# Patient Record
Sex: FEMALE | URBAN - METROPOLITAN AREA
[De-identification: names, ages, dates, MRNs, and addresses within clinical notes are randomized per-mention and may not be internally consistent; named-entity substitution may affect disease eponyms.]

---

## 2020-11-23 ENCOUNTER — OFFICE VISIT (OUTPATIENT)
Dept: URBAN - METROPOLITAN AREA CLINIC 33 | Facility: CLINIC | Age: 55
End: 2020-11-23

## 2020-11-23 ENCOUNTER — WEB ENCOUNTER (OUTPATIENT)
Dept: URBAN - METROPOLITAN AREA CLINIC 35 | Facility: CLINIC | Age: 55
End: 2020-11-23

## 2020-11-23 VITALS
DIASTOLIC BLOOD PRESSURE: 78 MMHG | TEMPERATURE: 97.3 F | HEIGHT: 67 IN | BODY MASS INDEX: 26.53 KG/M2 | SYSTOLIC BLOOD PRESSURE: 110 MMHG | WEIGHT: 169 LBS

## 2020-11-23 PROBLEM — 275978004 SCREENING FOR MALIGNANT NEOPLASM OF COLON: Status: ACTIVE | Noted: 2020-11-23

## 2020-11-23 RX ORDER — PSYLLIUM HUSK 0.4 G
1 TABLET WITH A MEAL CAPSULE ORAL ONCE A DAY
Qty: 30 | Status: ACTIVE | COMMUNITY

## 2020-11-23 RX ORDER — MELOXICAM 7.5 MG/1
1 TABLET TABLET ORAL ONCE A DAY
Qty: 30 | Status: ACTIVE | COMMUNITY

## 2020-11-23 RX ORDER — UBIDECARENONE 30 MG
AS DIRECTED CAPSULE ORAL
Status: ACTIVE | COMMUNITY

## 2020-11-23 RX ORDER — SODIUM PICOSULFATE, MAGNESIUM OXIDE, AND ANHYDROUS CITRIC ACID 10; 3.5; 12 MG/160ML; G/160ML; G/160ML
AS DIRECTED LIQUID ORAL
Qty: 1 KIT | OUTPATIENT
Start: 2020-11-23

## 2020-11-23 RX ORDER — SACCHAROMYCES BOULARDII 250 MG
1 CAPSULE CAPSULE ORAL TWICE A DAY
Qty: 60 | Status: ACTIVE | COMMUNITY

## 2020-11-23 NOTE — EXAM-MIGRATED EXAMINATIONS
General Examination : Medical assistant was in the room.;     GENERAL APPEARANCE: - alert, in no acute distress, well developed, well nourished;   ORAL CAVITY: - Mallampati classII;   HEART: - no murmurs, regular rate and rhythm, S1, S2 normal;   LUNGS: - clear to auscultation bilaterally, good air movement, no wheezes, rales, rhonchi;   ABDOMEN: - bowel sounds present, no masses palpable, no organomegaly , no rebound tenderness, soft, nontender, nondistended;

## 2020-11-23 NOTE — HPI-MIGRATED HPI
;     Colorectal Cancer Screening : Patient presents today for a consultation for a colorectal cancer screening. Patient admits  this is her first colonoscopy due to age. She denies a family hx of colon, gastric, or esophageal cancer/polyps. Patient admits normal bowel habits . She admits seeing bright red blood upon toilet paper intermittently 3-4 time the past 3-4 approximately . She denies rectal pain or straining at this time .   ;

## 2020-11-24 ENCOUNTER — OFFICE VISIT (OUTPATIENT)
Dept: URBAN - METROPOLITAN AREA CLINIC 33 | Facility: CLINIC | Age: 55
End: 2020-11-24

## 2020-12-21 ENCOUNTER — OFFICE VISIT (OUTPATIENT)
Dept: URBAN - METROPOLITAN AREA SURGERY CENTER 8 | Facility: SURGERY CENTER | Age: 55
End: 2020-12-21

## 2021-01-08 ENCOUNTER — OFFICE VISIT (OUTPATIENT)
Dept: URBAN - METROPOLITAN AREA CLINIC 35 | Facility: CLINIC | Age: 56
End: 2021-01-08